# Patient Record
Sex: FEMALE | Race: WHITE | Employment: STUDENT | URBAN - METROPOLITAN AREA
[De-identification: names, ages, dates, MRNs, and addresses within clinical notes are randomized per-mention and may not be internally consistent; named-entity substitution may affect disease eponyms.]

---

## 2017-01-07 ENCOUNTER — OFFICE VISIT (OUTPATIENT)
Dept: FAMILY MEDICINE CLINIC | Facility: CLINIC | Age: 22
End: 2017-01-07

## 2017-01-07 VITALS
DIASTOLIC BLOOD PRESSURE: 81 MMHG | WEIGHT: 134.19 LBS | HEIGHT: 62 IN | HEART RATE: 96 BPM | BODY MASS INDEX: 24.69 KG/M2 | RESPIRATION RATE: 18 BRPM | SYSTOLIC BLOOD PRESSURE: 135 MMHG | TEMPERATURE: 98 F

## 2017-01-07 DIAGNOSIS — Z01.419 ROUTINE GYNECOLOGICAL EXAMINATION: Primary | ICD-10-CM

## 2017-01-07 PROCEDURE — 99395 PREV VISIT EST AGE 18-39: CPT | Performed by: FAMILY MEDICINE

## 2017-01-07 RX ORDER — LEVONORGESTREL AND ETHINYL ESTRADIOL 0.1-0.02MG
1 KIT ORAL DAILY
Qty: 3 PACKAGE | Refills: 4 | Status: SHIPPED | OUTPATIENT
Start: 2017-01-07 | End: 2018-01-08

## 2017-01-07 NOTE — PROGRESS NOTES
HPI:   Joya Siemens is a 24year old female who presents for a complete physical exam.   Patient's last menstrual period was 12/27/2016 (exact date). Regular menses - better since on birth control. Trying to exercise more regularly - hard with school. discharge or itching,periods regular, denies dyspareunia  MUSCULOSKELETAL: denies back pain  NEURO: denies headaches  PSYCHE: denies depression or anxiety  HEMATOLOGIC: denies hx of anemia  ENDOCRINE: denies thyroid history  ALL/ASTHMA: denies hx of allerg

## 2017-01-09 LAB
C TRACH DNA SPEC QL NAA+PROBE: NEGATIVE
N GONORRHOEA DNA SPEC QL NAA+PROBE: NEGATIVE

## 2017-02-10 ENCOUNTER — TELEPHONE (OUTPATIENT)
Dept: FAMILY MEDICINE CLINIC | Facility: CLINIC | Age: 22
End: 2017-02-10

## 2017-02-10 NOTE — TELEPHONE ENCOUNTER
Pt states just had appt with [facility] and she was supposed to send script for Lutera for 3 packages and refills for a year. Pt states she was only given 1 package at the pharmacy.  Informed pt that [facility] did send a years supply in 90-day increments on 1/7/2017 but

## 2017-02-10 NOTE — TELEPHONE ENCOUNTER
Pt would like to speak to RN about medication:      Current Outpatient Prescriptions:  Levonorgestrel-Ethinyl Estrad (LUTERA) 0.1-20 MG-MCG Oral Tab Take 1 tablet by mouth daily.  Disp: 3 Package Rfl: 4

## 2017-06-12 ENCOUNTER — TELEPHONE (OUTPATIENT)
Dept: FAMILY MEDICINE CLINIC | Facility: CLINIC | Age: 22
End: 2017-06-12

## 2017-06-12 DIAGNOSIS — J30.2 SEASONAL ALLERGIC RHINITIS, UNSPECIFIED ALLERGIC RHINITIS TRIGGER: Primary | ICD-10-CM

## 2017-06-12 NOTE — TELEPHONE ENCOUNTER
Pt is calling requesting a referral to see Dr Sylwia Pickard pt state that it for her seasonal allergy  Pt is requesting a call back

## 2017-06-21 ENCOUNTER — TELEPHONE (OUTPATIENT)
Dept: FAMILY MEDICINE CLINIC | Facility: CLINIC | Age: 22
End: 2017-06-21

## 2017-06-21 NOTE — TELEPHONE ENCOUNTER
Can  Try switching allergy medications to generic zyrtec-I would avoid the ones with the \"D\" and just use otc sudafed from the pharmacy on days when congestion is presentand using otc flonase spray until can be help with allergy s/s.   Please let us know

## 2017-06-21 NOTE — TELEPHONE ENCOUNTER
Pt mother called office and pt  verified. Pt mother given message from NP below. Pt mother states it is very difficult for pt to come in for OV until August since she is away at college. Pt prefers to see Dr Kimmie Bonner only.   Pt mother is asking if somet an allergy specialist is needed.  CHENG Elise, FNP-C

## 2017-06-22 NOTE — TELEPHONE ENCOUNTER
Spoke with patient, name and  verified, and relayed message below in regards to allergy relief. Advised to call back if no improvement within the next week or two, patient verbalized understanding and agreement.  No further questions or concerns at this

## 2017-07-01 ENCOUNTER — OFFICE VISIT (OUTPATIENT)
Dept: FAMILY MEDICINE CLINIC | Facility: CLINIC | Age: 22
End: 2017-07-01

## 2017-07-01 VITALS
BODY MASS INDEX: 25 KG/M2 | HEART RATE: 92 BPM | DIASTOLIC BLOOD PRESSURE: 80 MMHG | SYSTOLIC BLOOD PRESSURE: 123 MMHG | WEIGHT: 134.19 LBS

## 2017-07-01 DIAGNOSIS — J30.2 SEASONAL ALLERGIC RHINITIS, UNSPECIFIED ALLERGIC RHINITIS TRIGGER: Primary | ICD-10-CM

## 2017-07-01 PROCEDURE — 99212 OFFICE O/P EST SF 10 MIN: CPT | Performed by: FAMILY MEDICINE

## 2017-07-01 PROCEDURE — 99213 OFFICE O/P EST LOW 20 MIN: CPT | Performed by: FAMILY MEDICINE

## 2017-07-01 RX ORDER — PREDNISONE 20 MG/1
TABLET ORAL
Qty: 10 TABLET | Refills: 0 | Status: SHIPPED | OUTPATIENT
Start: 2017-07-01 | End: 2018-01-08 | Stop reason: ALTCHOICE

## 2017-07-01 RX ORDER — MONTELUKAST SODIUM 10 MG/1
10 TABLET ORAL NIGHTLY
Qty: 90 TABLET | Refills: 2 | Status: SHIPPED | OUTPATIENT
Start: 2017-07-01 | End: 2018-01-08 | Stop reason: ALTCHOICE

## 2017-07-01 NOTE — PROGRESS NOTES
Nikko Virk is a 25year old female. Patient presents with: Allergies    HPI:   Reports severe allergies are worsening since college - taking claritin D regularly - switched to zyrtec and flonase and allegra. Stuffy nose and sinus headaches.  Went to [employer] [facility]. REG. PROF; Future      The patient indicates understanding of these issues and agrees to the plan.       Brooklynn Mejia MD  7/1/2017  10:42 AM

## 2017-07-01 NOTE — PATIENT INSTRUCTIONS
1. Seasonal allergic rhinitis, unspecified allergic rhinitis trigger  Prednisone now x 5 days with food. Singulair (montelukast) daily in  Evenings. Can continue allegra/zyrtec. Labs in winter when off meds for 5 days.

## 2017-07-12 RX ORDER — LEVONORGESTREL AND ETHINYL ESTRADIOL 0.1-0.02MG
KIT ORAL
Qty: 28 TABLET | Refills: 0 | OUTPATIENT
Start: 2017-07-12

## 2017-08-25 NOTE — MR AVS SNAPSHOT
After Visit Summary   1/7/2017    Tate Rodgers    MRN: [id number]           Visit Information        Provider Department Dept Phone    1/7/2017 11:00 AM Lyle Valiente MD [facility] [phone]      Your Vitals Were     BP Pulse Temp(Src) n/a

## 2018-01-08 ENCOUNTER — OFFICE VISIT (OUTPATIENT)
Dept: FAMILY MEDICINE CLINIC | Facility: CLINIC | Age: 23
End: 2018-01-08

## 2018-01-08 VITALS
HEART RATE: 104 BPM | DIASTOLIC BLOOD PRESSURE: 81 MMHG | BODY MASS INDEX: 24.54 KG/M2 | WEIGHT: 133.38 LBS | HEIGHT: 62 IN | SYSTOLIC BLOOD PRESSURE: 133 MMHG

## 2018-01-08 DIAGNOSIS — Z00.00 ROUTINE MEDICAL EXAM: Primary | ICD-10-CM

## 2018-01-08 PROCEDURE — 99395 PREV VISIT EST AGE 18-39: CPT | Performed by: FAMILY MEDICINE

## 2018-01-08 RX ORDER — LEVONORGESTREL AND ETHINYL ESTRADIOL 0.1-0.02MG
1 KIT ORAL DAILY
Qty: 3 PACKAGE | Refills: 4 | Status: SHIPPED | OUTPATIENT
Start: 2018-01-08 | End: 2018-09-05

## 2018-01-08 NOTE — PROGRESS NOTES
HPI:   Bambi Anna is a 25year old female who presents for a complete physical exam.    Pt here for regular check up. 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 Menses controlled with birth control. No concerns.  Plans to move to of anemia or easy bruising  ENDOCRINE: denies weight changes  ALL/ASTHMA: denies hx of allergy or asthma    EXAM:   /81 (BP Location: Left arm)   Pulse 104   Ht 5' 2\" (1.575 m)   Wt 133 lb 6.4 oz (60.5 kg)   LMP 01/02/2018 (Approximate)   BMI 24.40

## 2018-01-27 ENCOUNTER — LAB ENCOUNTER (OUTPATIENT)
Dept: LAB | Age: 23
End: 2018-01-27
Attending: FAMILY MEDICINE
Payer: COMMERCIAL

## 2018-01-27 DIAGNOSIS — Z11.1 SCREENING-PULMONARY TB: ICD-10-CM

## 2018-01-27 DIAGNOSIS — J30.2 SEASONAL ALLERGIC RHINITIS: ICD-10-CM

## 2018-01-27 PROCEDURE — 86480 TB TEST CELL IMMUN MEASURE: CPT

## 2018-01-27 PROCEDURE — 82785 ASSAY OF IGE: CPT

## 2018-01-27 PROCEDURE — 36415 COLL VENOUS BLD VENIPUNCTURE: CPT

## 2018-01-27 PROCEDURE — 86003 ALLG SPEC IGE CRUDE XTRC EA: CPT

## 2018-01-30 LAB
A ALTERNATA IGE QN: <0.1 KUA/L (ref ?–0.1)
C HERBARUM IGE QN: <0.1 KUA/L (ref ?–0.1)
CAT DANDER IGE QN: <0.1 KUA/L (ref ?–0.1)
COMMON RAGWEED IGE QN: <0.1 KUA/L (ref ?–0.1)
D FARINAE IGE QN: <0.1 KUA/L (ref ?–0.1)
DOG DANDER IGE QN: <0.1 KUA/L (ref ?–0.1)
GOOSEFOOT IGE QN: <0.1 KUA/L (ref ?–0.1)
HOUSE DUST HS IGE QN: <0.1 KUA/L (ref ?–0.1)
IGE SERPL-ACNC: 7.77 KU/L (ref 2–214)
KENT BLUE GRASS IGE QN: 2.79 KUA/L (ref ?–0.1)
M TB IFN-G CD4+ BCKGRND COR BLD-ACNC: 0.04 IU/ML
M TB IFN-G CD4+ T-CELLS BLD-ACNC: 0.03 IU/ML
M TB TUBERC IFN-G BLD QL: NEGATIVE
M TB TUBERC IGNF/MITOGEN IGNF CONTROL: >10 IU/ML
PER RYE GRASS IGE QN: 2.5 KUA/L (ref ?–0.1)
WHITE ELM IGE QN: <0.1 KUA/L (ref ?–0.1)
WHITE OAK IGE QN: <0.1 KUA/L (ref ?–0.1)

## 2018-02-26 RX ORDER — LEVONORGESTREL AND ETHINYL ESTRADIOL 0.1-0.02MG
KIT ORAL
Qty: 84 TABLET | Refills: 4 | Status: SHIPPED | OUTPATIENT
Start: 2018-02-26 | End: 2019-04-18

## 2018-04-18 RX ORDER — MONTELUKAST SODIUM 10 MG/1
10 TABLET ORAL DAILY
Qty: 90 TABLET | Refills: 3 | Status: SHIPPED | OUTPATIENT
Start: 2018-04-18 | End: 2019-04-13

## 2019-01-15 ENCOUNTER — TELEPHONE (OUTPATIENT)
Dept: INTERNAL MEDICINE CLINIC | Facility: CLINIC | Age: 24
End: 2019-01-15

## 2019-01-17 ENCOUNTER — TELEPHONE (OUTPATIENT)
Dept: FAMILY MEDICINE CLINIC | Facility: CLINIC | Age: 24
End: 2019-01-17

## 2019-01-17 NOTE — TELEPHONE ENCOUNTER
Please see message below. I think Lizzie Vasquez was swapped for lutera.   lutera refilled 9/5/18 for 1 year supply  Please advise

## 2019-01-17 NOTE — TELEPHONE ENCOUNTER
Can call pt to clarify but I have notes from [address] that she wanted the brand Lutera due to side effects with other.  Deny the [facility] refill

## 2019-01-19 NOTE — TELEPHONE ENCOUNTER
Patient returned call, confirmed she wanted to continue with Nigel Settles, was getting previously with [facility] they then stopped carrying it, she has now switched to [facility] and would like to stay on Nigel Settles.  Advised scripts were sent to her Dor[facility] for 1 year, c

## 2019-04-17 ENCOUNTER — PATIENT MESSAGE (OUTPATIENT)
Dept: FAMILY MEDICINE CLINIC | Facility: CLINIC | Age: 24
End: 2019-04-17

## 2019-04-18 ENCOUNTER — TELEPHONE (OUTPATIENT)
Dept: FAMILY MEDICINE CLINIC | Facility: CLINIC | Age: 24
End: 2019-04-18

## 2019-04-18 RX ORDER — LEVONORGESTREL AND ETHINYL ESTRADIOL 0.1-0.02MG
1 KIT ORAL DAILY
Qty: 3 PACKAGE | Refills: 1 | Status: SHIPPED | OUTPATIENT
Start: 2019-04-18

## 2019-04-18 NOTE — TELEPHONE ENCOUNTER
Will close this encounter. See refill TE 4/18/19    From: Raad Butler  To:  Luis Armando Rodriguez MD  Sent: 4/17/2019  8:49 PM CDT  Subject: Prescription Question    Hello,    Currently I live in [place], and I'm working full time and won't be able to make i

## 2019-04-18 NOTE — TELEPHONE ENCOUNTER
----- Message from Nataly LAN  [facility] sent at 4/17/2019  8:49 PM CDT -----  Regarding: Prescription Question  Contact: [phone]  Hello,    Currently I live in [place], and I'm working full time and won't be able to make it home until my scheduled appoint

## 2019-04-18 NOTE — TELEPHONE ENCOUNTER
Refill passed per [facility] protocol.     Gynecology Medications  Protocol Criteria:  · Appointment scheduled in the past 12 months or the next 3 months  · Pap smear in the past 12 months  · Pap smear WNL manually verified  Recent Outpatient Visits

## 2019-04-19 NOTE — TELEPHONE ENCOUNTER
See other 4/18/19 refill encounter.    Sent to local pharmacy per patient's request.   Pharmacy     [facility] [address] [place] [address], [place] [address] [facility] [phone], [phone]   Outpatient Medication Detail      Disp Refills Start End    Levonorgestrel-

## 2019-04-26 NOTE — TELEPHONE ENCOUNTER
pharmacy stt that medication listed below is not available to refill and is requesting for a verbal for a generic brand BC       Please advise

## 2020-08-04 NOTE — TELEPHONE ENCOUNTER
Refill Protocol Appointment Criteria  · Appointment scheduled in the past 12 months or in the next 3 months  Recent Outpatient Visits            1 week ago Seasonal allergic rhinitis, unspecified allergic rhinitis trigger    [facility], [facility], [address] [place]
alert
